# Patient Record
Sex: FEMALE | Race: WHITE | NOT HISPANIC OR LATINO | Employment: FULL TIME | URBAN - METROPOLITAN AREA
[De-identification: names, ages, dates, MRNs, and addresses within clinical notes are randomized per-mention and may not be internally consistent; named-entity substitution may affect disease eponyms.]

---

## 2018-06-29 ENCOUNTER — APPOINTMENT (OUTPATIENT)
Dept: RADIOLOGY | Facility: MEDICAL CENTER | Age: 33
End: 2018-06-29
Attending: EMERGENCY MEDICINE
Payer: MEDICAID

## 2018-06-29 ENCOUNTER — HOSPITAL ENCOUNTER (EMERGENCY)
Facility: MEDICAL CENTER | Age: 33
End: 2018-06-29
Attending: EMERGENCY MEDICINE
Payer: MEDICAID

## 2018-06-29 VITALS
RESPIRATION RATE: 16 BRPM | SYSTOLIC BLOOD PRESSURE: 122 MMHG | OXYGEN SATURATION: 98 % | BODY MASS INDEX: 36.32 KG/M2 | TEMPERATURE: 99.7 F | WEIGHT: 212.74 LBS | DIASTOLIC BLOOD PRESSURE: 82 MMHG | HEIGHT: 64 IN | HEART RATE: 72 BPM

## 2018-06-29 DIAGNOSIS — S82.892A CLOSED FRACTURE OF LEFT ANKLE, INITIAL ENCOUNTER: ICD-10-CM

## 2018-06-29 PROCEDURE — A9270 NON-COVERED ITEM OR SERVICE: HCPCS | Performed by: EMERGENCY MEDICINE

## 2018-06-29 PROCEDURE — 700102 HCHG RX REV CODE 250 W/ 637 OVERRIDE(OP): Performed by: EMERGENCY MEDICINE

## 2018-06-29 PROCEDURE — A9270 NON-COVERED ITEM OR SERVICE: HCPCS

## 2018-06-29 PROCEDURE — 36415 COLL VENOUS BLD VENIPUNCTURE: CPT

## 2018-06-29 PROCEDURE — 73590 X-RAY EXAM OF LOWER LEG: CPT | Mod: LT

## 2018-06-29 PROCEDURE — 302874 HCHG BANDAGE ACE 2 OR 3""

## 2018-06-29 PROCEDURE — 29515 APPLICATION SHORT LEG SPLINT: CPT

## 2018-06-29 PROCEDURE — 700111 HCHG RX REV CODE 636 W/ 250 OVERRIDE (IP): Performed by: EMERGENCY MEDICINE

## 2018-06-29 PROCEDURE — 700102 HCHG RX REV CODE 250 W/ 637 OVERRIDE(OP)

## 2018-06-29 PROCEDURE — 73610 X-RAY EXAM OF ANKLE: CPT | Mod: LT

## 2018-06-29 PROCEDURE — 99284 EMERGENCY DEPT VISIT MOD MDM: CPT

## 2018-06-29 PROCEDURE — 73630 X-RAY EXAM OF FOOT: CPT | Mod: LT

## 2018-06-29 RX ORDER — IBUPROFEN 200 MG
400 TABLET ORAL ONCE
Status: COMPLETED | OUTPATIENT
Start: 2018-06-29 | End: 2018-06-29

## 2018-06-29 RX ORDER — ACETAMINOPHEN 325 MG/1
325 TABLET ORAL ONCE
Status: COMPLETED | OUTPATIENT
Start: 2018-06-29 | End: 2018-06-29

## 2018-06-29 RX ORDER — OXYCODONE AND ACETAMINOPHEN 7.5; 325 MG/1; MG/1
1 TABLET ORAL ONCE
Status: COMPLETED | OUTPATIENT
Start: 2018-06-29 | End: 2018-06-29

## 2018-06-29 RX ORDER — OXYCODONE HYDROCHLORIDE 5 MG/1
5 TABLET ORAL EVERY 6 HOURS PRN
Qty: 16 TAB | Refills: 0 | Status: SHIPPED | OUTPATIENT
Start: 2018-06-29 | End: 2018-07-03

## 2018-06-29 RX ORDER — ONDANSETRON 4 MG/1
4 TABLET, ORALLY DISINTEGRATING ORAL ONCE
Status: COMPLETED | OUTPATIENT
Start: 2018-06-29 | End: 2018-06-29

## 2018-06-29 RX ADMIN — IBUPROFEN 400 MG: 200 TABLET, FILM COATED ORAL at 19:48

## 2018-06-29 RX ADMIN — OXYCODONE HYDROCHLORIDE AND ACETAMINOPHEN 1 TABLET: 7.5; 325 TABLET ORAL at 20:18

## 2018-06-29 RX ADMIN — ONDANSETRON 4 MG: 4 TABLET, ORALLY DISINTEGRATING ORAL at 20:27

## 2018-06-29 RX ADMIN — ACETAMINOPHEN 325 MG: 325 TABLET, FILM COATED ORAL at 19:49

## 2018-06-29 ASSESSMENT — LIFESTYLE VARIABLES: DO YOU DRINK ALCOHOL: NO

## 2018-06-29 ASSESSMENT — PAIN SCALES - GENERAL: PAINLEVEL_OUTOF10: 7

## 2018-06-30 NOTE — ED PROVIDER NOTES
ED Provider Note    Scribed for Boyd Lee M.D. by Dk Beyer. 6/29/2018, 8:01 PM.    Primary care provider: Rae Bennett P.A.-C.  Means of arrival: Walk-in  History obtained from: patient  History limited by: none    CHIEF COMPLAINT  Chief Complaint   Patient presents with   • Ankle Pain     LEFT       HPI  Anni Snowden is a 32 y.o. female who presents to the Emergency Department for evaluation of left ankle pain. Patient explains that she [place] a local stadium tonight when she walked off an 18 inch platform while running after her young son and rolled her left ankle. Patient explains that she inverted her left ankle initially and then everted her left ankle when she tried to compensate. She reports that when she everted her left ankle she began to develop her pain. Patient denies any other trauma. She has been unable to walk on the leg since the incident. She has not taken any medication for her symptoms.    REVIEW OF SYSTEMS  Pertinent positives include left ankle pain. Pertinent negatives include no other trauma. See [facility] for further details.     E.     PAST MEDICAL HISTORY   has a past medical history of Allergy; Anxiety; Depression; Fatigue; Known health problems: none; and Wrist pain, right.    SURGICAL HISTORY   has a past surgical history that includes cholecystectomy and hernia repair.    SOCIAL HISTORY  Social History   Substance Use Topics   • Smoking status: Never Smoker   • Smokeless tobacco: Never Used   • Alcohol use No      Comment: Rarely.      History   Drug Use No       FAMILY HISTORY  Family History   Problem Relation Age of Onset   • Diabetes Mother    • Stroke Father    • No Known Problems Son    • No Known Problems Son    • No Known Problems Son        CURRENT MEDICATIONS  No current facility-administered medications for this encounter.     Current Outpatient Prescriptions:   •  oxyCODONE immediate-release (ROXICODONE) 5 MG Tab, Take 1 Tab by mouth every 6 hours as needed  "for Severe Pain for up to 4 days., Disp: 16 Tab, Rfl: 0  •  ibuprofen (MOTRIN) 400 MG Tab, Take 1 Tab by mouth every 6 hours as needed. Always take with food., Disp: 40 Tab, Rfl: 1  •  cyclobenzaprine (FLEXERIL) 10 MG Tab, Take 1 Tab by mouth 1 time daily as needed for Moderate Pain or Muscle Spasms., Disp: 30 Tab, Rfl: 1  •  valacyclovir (VALTREX) 1 GM Tab, Take 1 Tab by mouth 2 times a day., Disp: 20 Tab, Rfl: 0  •  Multiple Vitamins-Minerals (MULTIVITAL PO), Take  by mouth., Disp: , Rfl:   •  B Complex Vitamins (B COMPLEX PO), Take  by mouth., Disp: , Rfl:   •  Methylsulfonylmethane Powder, by Does not apply route., Disp: , Rfl:   •  ondansetron (ZOFRAN) 4 MG Tab tablet, Take 1 Tab by mouth every 8 hours as needed for Nausea/Vomiting., Disp: 20 Tab, Rfl: 0      ALLERGIES  Allergies   Allergen Reactions   • Ciprofloxacin    • Erythromycin      Liver shuts down   • Penicillins Hives   • Sulfa Drugs        PHYSICAL EXAM  VITAL SIGNS: /88   Pulse 67   Temp 37.6 °C (99.7 °F) (Temporal)   Resp 16   Ht 1.626 m (5' 4\")   Wt 96.5 kg (212 lb 11.9 oz)   SpO2 98%   BMI 36.52 kg/m²   Vitals reviewed.  Constitutional: Alert in no apparent distress.  HENT: No signs of trauma, Bilateral external ears normal, Nose normal.   Eyes: Pupils are equal and reactive, Conjunctiva normal, Non-icteric.   Neck: Normal range of motion, No tenderness, Supple, No stridor.   Lymphatic: No lymphadenopathy noted.   Cardiovascular: Regular rate and rhythm, no murmurs.   Thorax & Lungs: Normal breath sounds, No respiratory distress, No wheezing, No chest tenderness.   Abdomen: Bowel sounds normal, Soft, No tenderness, No peritoneal signs, No masses, No pulsatile masses.   Skin: Warm, Dry, No erythema, No rash.   Back: No bony tenderness, No CVA tenderness.   Extremities: Intact distal pulses, No edema, No tenderness, No cyanosis  Musculoskeletal:  Mildly tender medial malleolus of left ankle. Very mild tibial tenderness. Tenderness to " distal left fibula. Tender to left lateral malleolus. 2+ dorsalis pedis pulse in left foot   Neurologic: Alert , Normal motor function, Normal sensory function, No focal deficits noted.   Psychiatric: Affect normal, Judgment normal, Mood normal.     DIAGNOSTIC STUDIES / PROCEDURES    RADIOLOGY  DX-ANKLE 3+ VIEWS LEFT   Final Result         1.  Age indeterminant fracture of the tip of the lateral malleolus.         DX-FOOT-COMPLETE 3+ LEFT   Final Result         1.  No acute traumatic bony injury.      DX-TIBIA AND FIBULA LEFT   Final Result         1.  Small bony fragment at the tip of lateral malleolus, appearance compatible with small amounts avulsion fracture fragment.        The radiologist's interpretation of all radiological studies have been reviewed by me.    COURSE & MEDICAL DECISION MAKING  Nursing notes, VS, PMSFHx reviewed in chart.  Differential diagnoses include but not limited to: fracture of distal fibula, ligamentous tear, dislocation, contusion    8:01 PM Patient seen and examined at bedside. Patient arrives afebrile with normal vital signs. Patient appears well hydrated and non-toxic. The physical exam is remarkable for tenderness over the left lateral malleolus. She is neurovascularly intact. Ordered for DX-tibia and fibula left, DX-foot complete 3+ left, DX-ankle 3+ views left to evaluate. Patient will be treated with 325 mg Tylenol, 400 mg Motrin, 7.5-325 mg Percocet, 4 mg Zofran ODT for her symptoms.     X-ray reveals lateral malleolar fracture. Patient splinted in a posterior short-leg with stirrups. She was given crutches. Tylenol and ibuprofen for pain control. Given a small prescription for oxycodone for breakthrough pain. She will need to follow-up with orthopedics. She was given the contact information for their clinic, I have asked her to call them on Monday for an appointment.    I reviewed prescription monitoring program for patient's narcotic use before prescribing a scheduled  drug.The patient will not drink alcohol nor drive with prescribed medications. The patient will return for new or worsening symptoms and is stable at the time of discharge.    The patient is referred to a primary physician for blood pressure management, diabetic screening, and for all other preventative health concerns.    In prescribing controlled substances to this patient, I certify that I have obtained and reviewed the medical history of Anni Snowden. I have also made a good chayo effort to obtain applicable records from other providers who have treated the patient and no other records are available at this time.     I have conducted a physical exam and documented it. I have reviewed Ms. Snowden’s prescription history as maintained by the [place] Prescription Monitoring Program.     I have assessed the patient’s risk for abuse, dependency, and addiction using the validated Opioid Risk Tool available at https://www.mdcalc.com/jcmgoj-qute-rtzs-ort-narcotic-abuse.     Given the above, I believe the benefits of controlled substance therapy outweigh the risks. The reasons for prescribing controlled substances include in my professional opinion, controlled substances are the only reasonable choice for this patient because they are appropriate for acute fracture. Accordingly, I have discussed the risk and benefits, treatment plan, and alternative therapies with the patient.     DISPOSITION:  Patient will be discharged home in stable condition.    FOLLOW UP:  Gildardo Olmedo M.D.  [address] [facility] [address]  [phone]    Schedule an appointment as soon as possible for a visit in 2 days  for further workup    Rae Bennett P.A.-C.  [address] [facility] [address]  [facility] [id number] [phone]    Schedule an appointment as soon as possible for a visit in 2 days  For recheck      OUTPATIENT MEDICATIONS:  New Prescriptions    OXYCODONE IMMEDIATE-RELEASE (ROXICODONE) 5 MG TAB    Take 1 Tab by mouth every 6  hours as needed for Severe Pain for up to 4 days.         FINAL IMPRESSION  1. Closed fracture of left ankle, initial encounter          IDk (Scribe), am scribing for, and in the presence of, Boyd Lee M.D..    Electronically signed by: Dk Beyer (Scribe), 6/29/2018    Boyd MITCHELL M.D. personally performed the services described in this documentation, as scribed by Dk Beyer in my presence, and it is both accurate and complete.    The note accurately reflects work and decisions made by me.  Boyd Lee  6/30/2018  6:14 AM

## 2018-06-30 NOTE — ED TRIAGE NOTES
"Anni Snowden  Chief Complaint   Patient presents with   • Ankle Pain     LEFT     Pt w/c to triage with above complaint. Pt states while standing on 1 ft tall cement block, she fell and rolled the LEFT ankle. Pain rated at 7/10. Ankle noted to be swollen, CMS intact. No skin discoloration at this time.     /88   Pulse 67   Temp 37.6 °C (99.7 °F) (Temporal)   Resp 16   Ht 1.626 m (5' 4\")   Wt 96.5 kg (212 lb 11.9 oz)   SpO2 98%   BMI 36.52 kg/m²     Pt informed of triage process and encouraged to notify staff of any changes or concerns. Pt verbalized understanding of instructions. Apologized for long wait time. Pt placed back in lobby.     "

## 2018-06-30 NOTE — DISCHARGE INSTRUCTIONS
You were seen in the ER for left ankle pain after rolling her ankle. There is a fracture at the tip of the bone on the outside of your left leg. We have placed you in a splint and given you crutches. You should take Tylenol and ibuprofen as directed on the bottle for pain control. I'm giving you a small prescription for oxycodone which is an opioid for breakthrough pain only. Please take this as directed. You may not drive or drink alcohol after taking this medication as it will make you sleepy. I would like you to call the orthopedic surgeon's office on Monday to make a follow-up appointment, I have given you his information on these discharge sheets. Please also follow-up with her primary care physician. Return to the ER with any new or worsening symptoms.    Ankle Fracture  A fracture is a break in a bone. A cast or splint may be used to protect the ankle and heal the break. Sometimes, surgery is needed.  Follow these instructions at home:  · Use crutches as told by your doctor. It is very important that you use your crutches correctly.  · Do not put weight or pressure on the injured ankle until told by your doctor.  · Keep your ankle raised (elevated) when sitting or lying down.  · Apply ice to the ankle:  ¨ Put ice in a plastic bag.  ¨ Place a towel between your cast and the bag.  ¨ Leave the ice on for 20 minutes, 2-3 times a day.  · If you have a plaster or fiberglass cast:  ¨ Do not try to scratch under the cast with any objects.  ¨ Check the skin around the cast every day. You may put lotion on red or sore areas.  ¨ Keep your cast dry and clean.  · If you have a plaster splint:  ¨ Wear the splint as told by your doctor.  ¨ You can loosen the elastic around the splint if your toes get numb, tingle, or turn cold or blue.  · Do not put pressure on any part of your cast or splint. It may break. Rest your plaster splint or cast only on a pillow the first 24 hours until it is fully hardened.  · Cover your cast or  splint with a plastic bag during showers.  · Do not lower your cast or splint into water.  · Take medicine as told by your doctor.  · Do not drive until your doctor says it is safe.  · Follow-up with your doctor as told. It is very important that you go to your follow-up visits.  Contact a doctor if:  The swelling and discomfort gets worse.  Get help right away if:  · Your splint or cast breaks.  · You continue to have very bad pain.  · You have new pain or swelling after your splint or cast was put on.  · Your skin or toes below the injured ankle:  ¨ Turn blue or gray.  ¨ Feel cold, numb, or you cannot feel them.  · There is a bad smell or yellowish white fluid (pus) coming from under the splint or cast.  This information is not intended to replace advice given to you by your health care provider. Make sure you discuss any questions you have with your health care provider.  Document Released: 10/15/2010 Document Revised: 05/25/2017 Document Reviewed: 07/17/2014  [employer] Interactive Patient Education © 2017 [employer]

## 2020-04-10 DIAGNOSIS — N30.00 ACUTE CYSTITIS WITHOUT HEMATURIA: ICD-10-CM

## 2020-04-10 RX ORDER — NITROFURANTOIN 25; 75 MG/1; MG/1
100 CAPSULE ORAL 2 TIMES DAILY
Qty: 10 CAP | Refills: 0 | Status: SHIPPED | OUTPATIENT
Start: 2020-04-10 | End: 2020-04-15

## 2021-01-20 PROBLEM — R63.5 RAPID WEIGHT GAIN: Status: ACTIVE | Noted: 2020-11-20

## 2021-01-20 PROBLEM — J34.9 SINUS DISEASE: Status: ACTIVE | Noted: 2020-12-18

## 2021-01-20 PROBLEM — G93.2 INTRACRANIAL HYPERTENSION, BENIGN: Status: ACTIVE | Noted: 2020-11-20

## 2021-01-20 PROBLEM — F43.9 STRESS: Status: ACTIVE | Noted: 2021-01-12

## 2021-01-20 PROBLEM — U07.1 COVID-19: Status: ACTIVE | Noted: 2020-11-20

## 2021-03-03 PROBLEM — G43.119 INTRACTABLE MIGRAINE WITH AURA WITHOUT STATUS MIGRAINOSUS: Status: ACTIVE | Noted: 2021-02-10

## 2021-11-17 ENCOUNTER — HOSPITAL ENCOUNTER (OUTPATIENT)
Dept: RADIOLOGY | Facility: MEDICAL CENTER | Age: 36
End: 2021-11-17
Payer: MEDICAID

## 2021-11-19 ENCOUNTER — HOSPITAL ENCOUNTER (OUTPATIENT)
Facility: MEDICAL CENTER | Age: 36
End: 2021-11-19
Attending: PHYSICIAN ASSISTANT
Payer: MEDICAID

## 2021-11-19 PROCEDURE — 87086 URINE CULTURE/COLONY COUNT: CPT

## 2021-11-19 PROCEDURE — 87077 CULTURE AEROBIC IDENTIFY: CPT

## 2021-11-19 PROCEDURE — 87186 SC STD MICRODIL/AGAR DIL: CPT | Mod: 91

## 2021-11-22 LAB
BACTERIA UR CULT: ABNORMAL
BACTERIA UR CULT: ABNORMAL
SIGNIFICANT IND 70042: ABNORMAL
SITE SITE: ABNORMAL
SOURCE SOURCE: ABNORMAL

## 2023-04-25 PROBLEM — B02.9 SHINGLES OUTBREAK: Status: ACTIVE | Noted: 2023-04-25

## 2023-04-25 PROBLEM — G93.2 PSEUDOTUMOR CEREBRI: Status: ACTIVE | Noted: 2023-04-25

## 2024-01-19 PROBLEM — J45.909 REACTIVE AIRWAY DISEASE WITHOUT COMPLICATION: Status: ACTIVE | Noted: 2024-01-19

## 2024-01-19 PROBLEM — O09.529 ENCOUNTER FOR SUPERVISION OF HIGH-RISK PREGNANCY WITH MULTIGRAVIDA OF ADVANCED MATERNAL AGE: Status: ACTIVE | Noted: 2024-01-19

## 2024-01-19 PROBLEM — O09.299 HX OF PREECLAMPSIA, PRIOR PREGNANCY, CURRENTLY PREGNANT: Status: ACTIVE | Noted: 2024-01-19

## 2024-01-19 PROBLEM — O26.891 RH NEGATIVE STATUS DURING PREGNANCY IN FIRST TRIMESTER: Status: ACTIVE | Noted: 2024-01-19

## 2024-01-19 PROBLEM — Z67.91 RH NEGATIVE STATUS DURING PREGNANCY IN FIRST TRIMESTER: Status: ACTIVE | Noted: 2024-01-19

## 2024-02-12 PROBLEM — Z82.79 FAMILY HISTORY OF CONGENITAL HEART DEFECT: Status: ACTIVE | Noted: 2024-02-12

## 2024-02-20 PROBLEM — O24.419 GESTATIONAL DIABETES MELLITUS (GDM) IN SECOND TRIMESTER: Status: ACTIVE | Noted: 2024-02-20

## 2024-02-20 PROBLEM — O26.899 RH NEGATIVE STATE IN ANTEPARTUM PERIOD: Status: ACTIVE | Noted: 2024-01-19

## 2024-02-22 PROBLEM — R03.0 ELEVATED BLOOD PRESSURE READING IN OFFICE WITHOUT DIAGNOSIS OF HYPERTENSION: Status: ACTIVE | Noted: 2024-02-22
